# Patient Record
Sex: FEMALE | Race: WHITE | HISPANIC OR LATINO | Employment: FULL TIME | URBAN - METROPOLITAN AREA
[De-identification: names, ages, dates, MRNs, and addresses within clinical notes are randomized per-mention and may not be internally consistent; named-entity substitution may affect disease eponyms.]

---

## 2022-11-20 ENCOUNTER — HOSPITAL ENCOUNTER (EMERGENCY)
Facility: HOSPITAL | Age: 37
Discharge: HOME | End: 2022-11-20
Attending: EMERGENCY MEDICINE

## 2022-11-20 ENCOUNTER — APPOINTMENT (EMERGENCY)
Dept: RADIOLOGY | Facility: HOSPITAL | Age: 37
End: 2022-11-20

## 2022-11-20 VITALS
SYSTOLIC BLOOD PRESSURE: 162 MMHG | OXYGEN SATURATION: 100 % | DIASTOLIC BLOOD PRESSURE: 101 MMHG | TEMPERATURE: 98.3 F | BODY MASS INDEX: 28.88 KG/M2 | HEART RATE: 77 BPM | RESPIRATION RATE: 17 BRPM | WEIGHT: 163 LBS | HEIGHT: 63 IN

## 2022-11-20 DIAGNOSIS — S93.401A SPRAIN OF RIGHT ANKLE, UNSPECIFIED LIGAMENT, INITIAL ENCOUNTER: Primary | ICD-10-CM

## 2022-11-20 RX ORDER — KETOROLAC TROMETHAMINE 30 MG/ML
15 INJECTION, SOLUTION INTRAMUSCULAR; INTRAVENOUS ONCE
Status: COMPLETED | OUTPATIENT
Start: 2022-11-20 | End: 2022-11-20

## 2022-11-20 RX ADMIN — KETOROLAC TROMETHAMINE 15 MG: 30 INJECTION, SOLUTION INTRAMUSCULAR at 02:40

## 2022-11-20 NOTE — DISCHARGE INSTRUCTIONS
Return with any  new or worsening symptoms  Follow up with orthopedics within the next 1 week for reevaluation

## 2022-11-20 NOTE — ED NOTES
Pedal pulses palpated 0120, feet were cold, +2 pulses, +2 perfusion  Rachell Moralesila Speaks, [address] [facility]  11/20/22 7718

## 2022-11-20 NOTE — Clinical Note
Gregg Bills was seen and treated in our emergency department on 11/20/2022  Other - See Comments    Non right weight bearing until cleared by Orthopedics    Diagnosis:     Sera Farrell  may return to work on return date  She may return on this date: 11/23/2022         If you have any questions or concerns, please don't hesitate to call        Margie Perez RN    ______________________________           _______________          _______________  Hospital Representative                              Date                                Time

## 2022-12-16 NOTE — ED PROVIDER NOTES
History  Chief Complaint   Patient presents with   • Ankle Injury     Pt arrived via wheelchair with c/o "missing one step, twisted R ankle and heard a crack" approx 20 min ago  -boris -Kaylin Roman is a 42 y o  female with no pertinent past medical history presenting today status post mechanical fall  Patient reports that they twisted her right ankle after missing a step when going up the stairs  Incident occurred approximately 20 minutes prior to arrival   No head strike or loss of consciousness  Neurovascularly intact  No numbness/ting/weakness in the extremities  They are able to bear weight to the lower extremity but experience pain with doing so  No further complaints at this time  None       History reviewed  No pertinent past medical history  History reviewed  No pertinent surgical history  History reviewed  No pertinent family history  I have reviewed and agree with the history as documented  E-Cigarette/Vaping     E-Cigarette/Vaping Substances     Social History     Tobacco Use   • Smoking status: Never   • Smokeless tobacco: Never   Substance Use Topics   • Alcohol use: Never   • Drug use: Never       Review of Systems   Constitutional: Negative for chills and fever  HENT: Negative for ear pain and sore throat  Eyes: Negative for pain and visual disturbance  Respiratory: Negative for cough and shortness of breath  Cardiovascular: Negative for chest pain and palpitations  Gastrointestinal: Negative for abdominal pain and vomiting  Genitourinary: Negative for dysuria and hematuria  Musculoskeletal: Positive for arthralgias  Negative for back pain  Skin: Negative for color change and rash  Neurological: Negative for seizures and syncope  All other systems reviewed and are negative  Physical Exam  Physical Exam  Vitals and nursing note reviewed  Constitutional:       General: She is not in acute distress  Appearance: She is well-developed  HENT:      Head: Normocephalic and atraumatic  Eyes:      Conjunctiva/sclera: Conjunctivae normal    Cardiovascular:      Rate and Rhythm: Normal rate and regular rhythm  Heart sounds: No murmur heard  Pulmonary:      Effort: Pulmonary effort is normal  No respiratory distress  Breath sounds: Normal breath sounds  Abdominal:      Palpations: Abdomen is soft  Tenderness: There is no abdominal tenderness  Musculoskeletal:         General: Swelling (right ankle) and tenderness (lateral Malleolus) present  Cervical back: Neck supple  Skin:     General: Skin is warm and dry  Capillary Refill: Capillary refill takes less than 2 seconds  Neurological:      General: No focal deficit present  Mental Status: She is alert and oriented to person, place, and time  Psychiatric:         Mood and Affect: Mood normal          Vital Signs  ED Triage Vitals [11/20/22 0121]   Temperature Pulse Respirations Blood Pressure SpO2   98 3 °F (36 8 °C) 77 17 (!) 162/101 100 %      Temp Source Heart Rate Source Patient Position - Orthostatic VS BP Location FiO2 (%)   Oral Monitor Sitting Left arm --      Pain Score       8           Vitals:    11/20/22 0121   BP: (!) 162/101   Pulse: 77   Patient Position - Orthostatic VS: Sitting         Visual Acuity      ED Medications  Medications   ketorolac (TORADOL) injection 15 mg (15 mg Intramuscular Given 11/20/22 0240)       Diagnostic Studies  Results Reviewed     None                 XR ankle 3+ views RIGHT   Final Result by Mallory Carroll MD (11/20 1138)      Soft tissue swelling over the lateral malleolus without acute osseous injury  Workstation performed: [id number]                    Procedures  Procedures         ED Course                               SBIRT 20yo+    Flowsheet Row Most Recent Value   SBIRT (23 yo +)    In order to provide better care to our patients, we are screening all of our patients for alcohol and drug use   Would it be okay to ask you these screening questions? Yes Filed at: 11/20/2022 0245   Initial Alcohol Screen: US AUDIT-C     1  How often do you have a drink containing alcohol? 2 Filed at: 11/20/2022 0245   2  How many drinks containing alcohol do you have on a typical day you are drinking? 2 Filed at: 11/20/2022 0245   3b  FEMALE Any Age, or MALE 65+: How often do you have 4 or more drinks on one occassion? 0 Filed at: 11/20/2022 0245   Audit-C Score 4 Filed at: 11/20/2022 0245   NIKO: How many times in the past year have you    Used an illegal drug or used a prescription medication for non-medical reasons? Never Filed at: 11/20/2022 0245                    [facility]  Number of Diagnoses or Management Options  Sprain of right ankle, unspecified ligament, initial encounter: minor  Diagnosis management comments: Posterior leg sugar-tong static splint placed by ED tech  Recommended very close follow-up with orthopedics  Strict return criteria were discussed with patient  They demonstrated understanding         Amount and/or Complexity of Data Reviewed  Tests in the radiology section of CPT®: ordered and reviewed  Review and summarize past medical records: yes  Independent visualization of images, tracings, or specimens: yes    Risk of Complications, Morbidity, and/or Mortality  Presenting problems: low  Diagnostic procedures: low  Management options: low    Patient Progress  Patient progress: stable      Disposition  Final diagnoses:   Sprain of right ankle, unspecified ligament, initial encounter     Time reflects when diagnosis was documented in both MDM as applicable and the Disposition within this note     Time User Action Codes Description Comment    11/20/2022  2:22 AM Janis Alexander Add [S93 401A] Sprain of right ankle, unspecified ligament, initial encounter       ED Disposition     ED Disposition   Discharge    Condition   Stable    Date/Time   Sun Nov 20, 2022 Carlos A Bentley 33 discharge to home/self care  Follow-up Information     Follow up With Specialties Details Why Contact Info Additional [address] [facility] Emergency Department Emergency Medicine Go to  If symptoms worsen [address] [facility] [id number] [facility] Emergency Department, [address] [facility], [facility], [address] Orthopedic Surgery Call today To schedule an appointment for follow-up [address] [facility] Dave Best 42 [phone] [address] [facility], [address] [address] [facility], [facility], [phone]          There are no discharge medications for this patient            PDMP Review     None          ED Provider  Electronically Signed by           Ritu Pineda PA-C  12/16/22 9417